# Patient Record
Sex: MALE | Race: WHITE | Employment: FULL TIME | URBAN - METROPOLITAN AREA
[De-identification: names, ages, dates, MRNs, and addresses within clinical notes are randomized per-mention and may not be internally consistent; named-entity substitution may affect disease eponyms.]

---

## 2017-08-27 ENCOUNTER — HOSPITAL ENCOUNTER (EMERGENCY)
Age: 42
Discharge: HOME | End: 2017-08-27
Attending: EMERGENCY MEDICINE
Payer: COMMERCIAL

## 2017-08-27 VITALS
TEMPERATURE: 98 F | WEIGHT: 200 LBS | DIASTOLIC BLOOD PRESSURE: 80 MMHG | RESPIRATION RATE: 18 BRPM | HEART RATE: 67 BPM | BODY MASS INDEX: 25.67 KG/M2 | SYSTOLIC BLOOD PRESSURE: 129 MMHG | OXYGEN SATURATION: 99 % | HEIGHT: 74 IN

## 2017-08-27 DIAGNOSIS — T15.01XA CORNEAL FOREIGN BODY, RIGHT, INITIAL ENCOUNTER: Primary | ICD-10-CM

## 2017-08-27 PROCEDURE — 99284 EMERGENCY DEPT VISIT MOD MDM: CPT | Performed by: EMERGENCY MEDICINE

## 2017-08-27 PROCEDURE — 74011000250 HC RX REV CODE- 250: Performed by: EMERGENCY MEDICINE

## 2017-08-27 PROCEDURE — 75810000285 HC RMVL FB EYE W/ OR W/O SLIT LAMP: Performed by: EMERGENCY MEDICINE

## 2017-08-27 RX ORDER — TETRACAINE HYDROCHLORIDE 5 MG/ML
2 SOLUTION OPHTHALMIC
Status: COMPLETED | OUTPATIENT
Start: 2017-08-27 | End: 2017-08-27

## 2017-08-27 RX ORDER — ONDANSETRON 4 MG/1
4 TABLET, ORALLY DISINTEGRATING ORAL
Qty: 8 TAB | Refills: 2 | Status: SHIPPED | OUTPATIENT
Start: 2017-08-27

## 2017-08-27 RX ORDER — HYDROCODONE BITARTRATE AND ACETAMINOPHEN 5; 325 MG/1; MG/1
1 TABLET ORAL
Qty: 15 TAB | Refills: 0 | Status: SHIPPED | OUTPATIENT
Start: 2017-08-27

## 2017-08-27 RX ADMIN — TETRACAINE HYDROCHLORIDE 2 DROP: 5 SOLUTION OPHTHALMIC at 03:07

## 2017-08-27 NOTE — Clinical Note
Follow up with Dr Reanna Killian this afternoon. His office is at [address] in [place]. His phone number is [phone]. Call his office in the early afternoon to make arrangements to be seen.

## 2017-08-27 NOTE — ED NOTES
I have reviewed discharge and follow-up instructions with the patient and spouse. The patient and spouse verbalized understanding. 2 prescriptions given to patient. Patient ambulatory upon discharge home with spouse.

## 2017-08-27 NOTE — ED PROVIDER NOTES
HPI Comments: Patient states he was grinding metal earlier today wearing safety glasses when a small piece of metal went up under his glasses and productive in the right eye. Yesterday he had no pain immediately but later on started having some achy pain. He has had this happen before and the ophthalmologist  Jordan Carrera him some contact lenses to wear which he says helped with his pain. So he put a contact lens in his right eye without any improvement in his symptoms. His vision is normal though he has some right eye pain and redness. Elements of this note were created using speech recognition software. As such, errors of speech recognition may be present. Patient is a 42 y.o. male presenting with foreign body in eye. The history is provided by the patient. Foreign Body in [facility] symptoms include eye redness and pain. Pertinent negatives include no nausea, no vomiting and no fever. No past medical history on file. No past surgical history on file. No family history on file. Social History     Social History    Marital status:      Spouse name: N/A    Number of children: N/A    Years of education: N/A     Occupational History    Not on file. Social History Main Topics    Smoking status: Never Smoker    Smokeless tobacco: Not on file    Alcohol use Yes      Comment: occ    Drug use: No    Sexual activity: Not on file     Other Topics Concern    Not on file     Social History Narrative    No narrative on file         ALLERGIES: Review of patient's allergies indicates no known allergies. Review of Systems   Constitutional: Negative for chills and fever. Eyes: Positive for pain and redness. Gastrointestinal: Negative for nausea and vomiting. All other systems reviewed and are negative.       Vitals:    08/27/17 0250   BP: 124/89   Pulse: (!) 54   Resp: 16   Temp: 97.8 °F (36.6 °C)   SpO2: 99%   Weight: 90.7 kg (200 lb)   Height: 6' 2\" (1.88 m) Physical Exam   Constitutional: He is oriented to person, place, and time. He appears well-developed and well-nourished. HENT:   Head: Normocephalic and atraumatic. Eyes: Conjunctivae are normal. Pupils are equal, round, and reactive to light. Punctate foreign body right cornea at the 8 o'clock position as indicated, not in the central vision   Neck: Normal range of motion. Neck supple. Musculoskeletal: He exhibits no edema or tenderness. Neurological: He is alert and oriented to person, place, and time. Skin: Skin is warm and dry. Psychiatric: He has a normal mood and affect. His behavior is normal.   Nursing note and vitals reviewed. MDM  Number of Diagnoses or Management Options  Corneal foreign body, right, initial encounter: new and requires workup  Diagnosis management comments: Differential diagnosis: Corneal abrasion, corneal foreign body, corneal ulcer  4:12 AM spoke with ophthalmology on-call, he wants to see the patient  Later on this afternoon. His contact information was given       Amount and/or Complexity of Data Reviewed  Discuss the patient with other providers: yes    Risk of Complications, Morbidity, and/or Mortality  Presenting problems: moderate  Diagnostic procedures: moderate  Management options: moderate    Patient Progress  Patient progress: improved    ED Course       Foreign Body Removal  Date/Time: 8/27/2017 4:12 AM  Performed by: Abimbola Giraldo  Authorized by: Abimbola Giraldo     Consent:     Consent obtained:  Verbal    Consent given by:  Patient    Risks discussed:  Infection and pain  Location:     Location: cornea. Depth: Intradermal    Tendon involvement:  None  Pre-procedure details:     Imaging:  None  Anesthesia (see MAR for exact dosages): Anesthesia method:  Topical application    Topical anesthesia: Alcaine drops.   Procedure type:     Procedure complexity:  Simple  Procedure details:     Dissection of underlying tissues: no      Bloodless field: yes      Removal mechanism: 21-gauge needle. Guidance comment:  Visualization with slit lamp    Foreign bodies recovered:  1    Description:  Mmetallic    Intact foreign body removal: no    Post-procedure details:     Confirmation:  Residual foreign bodies remain    Skin closure:  None    Patient tolerance of procedure:   Tolerated well, no immediate complications

## 2017-08-27 NOTE — DISCHARGE INSTRUCTIONS
Object in the Eye: Care Instructions  Your Care Instructions  It is common for a speck of dirt or a small object, such as an eyelash or an insect, to get in the eye. Usually your tears wash the object out. But the speck can scratch the surface of the eye (cornea). If the eye surface is scratched, it can feel as if something is still in the eye. Most surface scratches are minor and heal on their own in a day or two. If the object was still in your eye, your doctor probably removed it during your exam. Sometimes these objects become stuck deep in the eye and require more treatment. For instance, a metal object may leave a rust ring. Follow-up care is a key part of your treatment and safety. Be sure to make and go to all appointments, and call your doctor if you are having problems. It's also a good idea to know your test results and keep a list of the medicines you take. How can you care for yourself at home? · The doctor probably used medicine to numb your eye. When it wears off in 30 to 60 minutes, your eye pain may come back. Take over-the-counter pain medicine, such as acetaminophen (Tylenol), ibuprofen (Advil, Motrin), or naproxen (Aleve), as needed. Read and follow all instructions on the label. · Do not take two or more pain medicines at the same time unless the doctor told you to. Many pain medicines have acetaminophen, which is Tylenol. Too much acetaminophen (Tylenol) can be harmful. · If your doctor prescribed antibiotics, take them as directed. Do not stop taking them just because you feel better. You need to take the full course of antibiotics. · The doctor may have put a patch over your injured eye. If so, keep your eye closed under the patch. This will make your eye feel better. Do not remove the patch until your doctor has told you to. · If you do not have a patch, keep your hurt eye closed to reduce pain. · Wash your hands before touching your eye. · Do not rub your injured eye.  Rubbing can make it worse. · Use the prescribed eyedrops or ointment as directed. Be sure the dropper or bottle tip is clean. · To put in eyedrops or ointment:  ¨ Tilt your head back, and pull your lower eyelid down with one finger. ¨ Drop or squirt the medicine inside the lower lid. ¨ Close your eye for 30 to 60 seconds to let the drops or ointment move around. ¨ Do not touch the ointment or dropper tip to your eyelashes or any other surface. · Do not use a contact lens in your hurt eye until your doctor says you can. Also, do not wear eye makeup until your eye heals. · Do not drive if you are wearing an eye patch. You cannot  distances well. · For the first 24 to 48 hours, limit reading and other tasks that require a lot of eye movement. · Bright light may hurt. It may help to wear dark glasses. · To prevent eye injuries in the future, wear safety glasses or goggles when you work with machines or tools, mow the lawn, or ride a bike or motorcycle. When should you call for help? Call 911 anytime you think you may need emergency care. For example, call if:  · You suddenly cannot see or can barely see. Call your doctor now or seek immediate medical care if:  · You have signs of infection in the eye, such as:  ¨ Pus or thick discharge coming from the eye. ¨ Redness or swelling around the eye. ¨ A fever. · You have new or increasing eye pain. · It feels like sand is in your eye when you blink. Watch closely for changes in your health, and be sure to contact your doctor if:  · You are not getting better after 1 day (24 hours). Where can you learn more? Go to http://naty-campos.info/. Enter L409 in the search box to learn more about \"Object in the Eye: Care Instructions. \"  Current as of: March 20, 2017  Content Version: 11.3  © 2049-1130 [employer].  Care instructions adapted under license by [employer] (which disclaims liability or warranty for this information). If you have questions about a medical condition or this instruction, always ask your healthcare professional. [place] [address] any warranty or liability for your use of this information.